# Patient Record
Sex: MALE | Race: BLACK OR AFRICAN AMERICAN | URBAN - METROPOLITAN AREA
[De-identification: names, ages, dates, MRNs, and addresses within clinical notes are randomized per-mention and may not be internally consistent; named-entity substitution may affect disease eponyms.]

---

## 2017-10-11 ENCOUNTER — OFFICE VISIT (OUTPATIENT)
Dept: FAMILY MEDICINE | Facility: CLINIC | Age: 27
End: 2017-10-11
Payer: COMMERCIAL

## 2017-10-11 VITALS
HEIGHT: 70 IN | SYSTOLIC BLOOD PRESSURE: 118 MMHG | HEART RATE: 92 BPM | DIASTOLIC BLOOD PRESSURE: 70 MMHG | TEMPERATURE: 97.2 F | BODY MASS INDEX: 27.2 KG/M2 | WEIGHT: 190 LBS

## 2017-10-11 DIAGNOSIS — B35.6 TINEA CRURIS: ICD-10-CM

## 2017-10-11 DIAGNOSIS — J45.990 EXERCISE-INDUCED ASTHMA: Primary | ICD-10-CM

## 2017-10-11 PROCEDURE — 99203 OFFICE O/P NEW LOW 30 MIN: CPT | Performed by: PHYSICIAN ASSISTANT

## 2017-10-11 RX ORDER — ALBUTEROL SULFATE 90 UG/1
2 AEROSOL, METERED RESPIRATORY (INHALATION) EVERY 4 HOURS PRN
Qty: 8.5 G | Refills: 3 | Status: SHIPPED | OUTPATIENT
Start: 2017-10-11

## 2017-10-11 RX ORDER — CLOTRIMAZOLE 1 %
CREAM (GRAM) TOPICAL 2 TIMES DAILY
Qty: 15 G | Refills: 1 | Status: SHIPPED | OUTPATIENT
Start: 2017-10-11

## 2017-10-11 RX ORDER — ALBUTEROL SULFATE 90 UG/1
2 AEROSOL, METERED RESPIRATORY (INHALATION)
COMMUNITY
End: 2017-10-11

## 2017-10-11 NOTE — PROGRESS NOTES
"  SUBJECTIVE:   Lars Perkins is a 27 year old male who presents to clinic today for the following health issues:      Asthma Follow-Up    Was ACT completed today?  Yes  No flowsheet data found.    Recent asthma triggers that patient is dealing with: None      Amount of exercise or physical activity: 2-3 days/week for an average of 45-60 minutes    Problems taking medications regularly: No    Medication side effects: none  Diet: regular (no restrictions)    Only needs albuterol with exercise.  occasionally has some shortness of breath.        Rash  Onset: 2-3 weeks    Description:   Location: inner thigh   Character: red  Itching (Pruritis): YES    Progression of Symptoms:  same    Accompanying Signs & Symptoms:  Fever: no   Body aches or joint pain: no   Sore throat symptoms: no   Recent cold symptoms: no     History:   Previous similar rash: no     Precipitating factors:   Exposure to similar rash: YES- wife with yeast infection   New exposures: None   Recent travel: no     Alleviating factors:      Therapies Tried and outcome: none             Problem list and histories reviewed & adjusted, as indicated.  Additional history: as documented    There is no problem list on file for this patient.    History reviewed. No pertinent surgical history.    Social History   Substance Use Topics     Smoking status: Never Smoker     Smokeless tobacco: Never Used     Alcohol use Yes     Family History   Problem Relation Age of Onset     DIABETES No family hx of      Hypertension No family hx of              Reviewed and updated as needed this visit by clinical staffTobacco  Allergies  Med Hx  Surg Hx  Fam Hx  Soc Hx      Reviewed and updated as needed this visit by Provider         ROS:  As above    OBJECTIVE:     /70  Pulse 92  Temp 97.2  F (36.2  C) (Oral)  Ht 5' 10\" (1.778 m)  Wt 190 lb (86.2 kg)  BMI 27.26 kg/m2  Body mass index is 27.26 kg/(m^2).  GENERAL: healthy, alert and no distress  SKIN: " hyperpigmented skin with some papules and cracked skin both sides groin     Diagnostic Test Results:  none     ASSESSMENT/PLAN:       1. Exercise-induced asthma  Stable.  refilled  - albuterol (PROAIR HFA) 108 (90 BASE) MCG/ACT Inhaler; Inhale 2 puffs into the lungs every 4 hours as needed for shortness of breath / dyspnea or wheezing As needed  Dispense: 8.5 g; Refill: 3  - Asthma Action Plan (AAP)    2. Tinea cruris  Follow up as needed  - clotrimazole (LOTRIMIN) 1 % cream; Apply topically 2 times daily  Dispense: 15 g; Refill: 1    FUTURE APPOINTMENTS:       - Follow-up visit in 6 months    Lyric Rasmussen PA-C  [facility]

## 2017-10-11 NOTE — NURSING NOTE
"Chief Complaint   Patient presents with     Asthma     follow-up     Derm Problem     rash on legs for the past 2-3 weeks        Initial /70  Pulse 92  Temp 97.2  F (36.2  C) (Oral)  Ht 5' 10\" (1.778 m)  Wt 190 lb (86.2 kg)  BMI 27.26 kg/m2 Estimated body mass index is 27.26 kg/(m^2) as calculated from the following:    Height as of this encounter: 5' 10\" (1.778 m).    Weight as of this encounter: 190 lb (86.2 kg).  Medication Reconciliation: complete    "

## 2017-10-11 NOTE — MR AVS SNAPSHOT
"              After Visit Summary   10/11/2017    Lars Perkins    MRN: [id number]           Patient Information     Date Of Birth          1990        Visit Information        Provider Department      10/11/2017 11:40 AM Lyric Rasmussen PA-C [facility]        Today's Diagnoses     Exercise-induced asthma    -  1    Tinea cruris           Follow-ups after your visit        Who to contact     If you have questions or need follow up information about today's clinic visit or your schedule please contact [facility] directly at [phone].  Normal or non-critical lab and imaging results will be communicated to you by [employer], letter or phone within 4 business days after the clinic has received the results. If you do not hear from us within 7 days, please contact the clinic through [employer] or phone. If you have a critical or abnormal lab result, we will notify you by phone as soon as possible.  Submit refill requests through [employer] or call your pharmacy and they will forward the refill request to us. Please allow 3 business days for your refill to be completed.          Additional Information About Your Visit        MyChart Information     [employer] lets you send messages to your doctor, view your test results, renew your prescriptions, schedule appointments and more. To sign up, go to [email or web address] . Click on \"Log in\" on the left side of the screen, which will take you to the Welcome page. Then click on \"Sign up Now\" on the right side of the page.     You will be asked to enter the access code listed below, as well as some personal information. Please follow the directions to create your username and password.     Your access code is: [email or web address]  Expires: 2018 12:08 PM     Your access code will  in 90 days. If you need help or a new code, please call your [facility] or [phone].        Care EveryWhere ID     This is your Care " "EveryWhere ID. This could be used by other organizations to access your [facility] medical records  [id number]        Your Vitals Were     Pulse Temperature Height BMI (Body Mass Index)          92 97.2  F (36.2  C) (Oral) 5' 10\" (1.778 m) 27.26 kg/m2         Blood Pressure from Last 3 Encounters:   10/11/17 118/70    Weight from Last 3 Encounters:   10/11/17 190 lb (86.2 kg)              We Performed the Following     Asthma Action Plan (AAP)          Today's Medication Changes          These changes are accurate as of: 10/11/17 12:08 PM.  If you have any questions, ask your nurse or doctor.               Start taking these medicines.        Dose/Directions    clotrimazole 1 % cream   Commonly known as:  LOTRIMIN   Used for:  Tinea cruris   Started by:  Lyric Rasmussen PA-C        Apply topically 2 times daily   Quantity:  15 g   Refills:  1         These medicines have changed or have updated prescriptions.        Dose/Directions    albuterol 108 (90 BASE) MCG/ACT Inhaler   Commonly known as:  PROAIR HFA   This may have changed:    - when to take this  - reasons to take this   Used for:  Exercise-induced asthma   Changed by:  Lyric Rasmussen PA-C        Dose:  2 puff   Inhale 2 puffs into the lungs every 4 hours as needed for shortness of breath / dyspnea or wheezing As needed   Quantity:  8.5 g   Refills:  3            Where to get your medicines      These medications were sent to [facility] [place], [place] - [address]  [address], [facility] [address]     Phone:  [phone]     albuterol 108 (90 BASE) MCG/ACT Inhaler    clotrimazole 1 % cream                Primary Care Provider    None Specified       No primary provider on file.        Equal Access to Services     RAKESH PENA : Erickson Acosta, watimbo luswapna, qaybta kaclau mckeon, mirela hale. So [facility] [phone].    ATENCIÓN: Si " jayde hunter, tiene a anders disposición servicios gratuitos de asistencia lingüística. Jus gomes [phone].    We comply with applicable federal civil rights laws and [place] laws. We do not discriminate on the basis of race, color, national origin, age, disability, sex, sexual orientation, or gender identity.            Thank you!     Thank you for choosing [facility]  for your care. Our goal is always to provide you with excellent care. Hearing back from our patients is one way we can continue to improve our services. Please take a few minutes to complete the written survey that you may receive in the mail after your visit with us. Thank you!             Your Updated Medication List - Protect others around you: Learn how to safely use, store and throw away your medicines at www.disposemymeds.org.          This list is accurate as of: 10/11/17 12:08 PM.  Always use your most recent med list.                   Brand Name Dispense Instructions for use Diagnosis    albuterol 108 (90 BASE) MCG/ACT Inhaler    PROAIR HFA    8.5 g    Inhale 2 puffs into the lungs every 4 hours as needed for shortness of breath / dyspnea or wheezing As needed    Exercise-induced asthma       clotrimazole 1 % cream    LOTRIMIN    15 g    Apply topically 2 times daily    Tinea cruris

## 2017-10-11 NOTE — LETTER
My Asthma Action Plan  Name: Lars Perkins   YOB: 1990  Date: 10/11/2017   My doctor: Lyric Rasmussen PA-C   My clinic: [facility]        My Control Medicine: None  My Rescue Medicine: Albuterol (Proair/Ventolin/Proventil) inhaler 2 puffs every 4 hours as needed   My Asthma Severity: intermittent  Avoid your asthma triggers: exercise or sports  None            GREEN ZONE   Good Control    I feel good    No cough or wheeze    Can work, sleep and play without asthma symptoms       Take your asthma control medicine every day.     1. If exercise triggers your asthma, take your rescue medication    15 minutes before exercise or sports, and    During exercise if you have asthma symptoms  2. Spacer to use with inhaler: If you have a spacer, make sure to use it with your inhaler             YELLOW ZONE Getting Worse  I have ANY of these:    I do not feel good    Cough or wheeze    Chest feels tight    Wake up at night   1. Keep taking your Green Zone medications  2. Start taking your rescue medicine:    every 20 minutes for up to 1 hour. Then every 4 hours for 24-48 hours.  3. If you stay in the Yellow Zone for more than 12-24 hours, contact your doctor.  4. If you do not return to the Green Zone in 12-24 hours or you get worse, start taking your oral steroid medicine if prescribed by your provider.           RED ZONE Medical Alert - Get Help  I have ANY of these:    I feel awful    Medicine is not helping    Breathing getting harder    Trouble walking or talking    Nose opens wide to breathe       1. Take your rescue medicine NOW  2. If your provider has prescribed an oral steroid medicine, start taking it NOW  3. Call your doctor NOW  4. If you are still in the Red Zone after 20 minutes and you have not reached your doctor:    Take your rescue medicine again and    Call 911 or go to the emergency room right away    See your regular doctor within 2 weeks of an Emergency Room  or Urgent Care visit for follow-up treatment.        Electronically signed by: Lyric Rasmussen, October 11, 2017    Annual Reminders:  Meet with Asthma Educator,  Flu Shot in the Fall, consider Pneumonia Vaccination for patients with asthma (aged 19 and older).    Pharmacy: [facility] [place], [place] - [address] [place] [address]. [place]                    Asthma Triggers  How To Control Things That Make Your Asthma Worse    Triggers are things that make your asthma worse.  Look at the list below to help you find your triggers and what you can do about them.  You can help prevent asthma flare-ups by staying away from your triggers.      Trigger                                                          What you can do   Cigarette Smoke  Tobacco smoke can make asthma worse. Do not allow smoking in your home, car or around you.  Be sure no one smokes at a child s day care or school.  If you smoke, ask your health care provider for ways to help you quit.  Ask family members to quit too.  Ask your health care provider for a referral to Quit Plan to help you quit smoking, or call 2-316-854-PLAN.     Colds, Flu, Bronchitis  These are common triggers of asthma. Wash your hands often.  Don t touch your eyes, nose or mouth.  Get a flu shot every year.     Dust Mites  These are tiny bugs that live in cloth or carpet. They are too small to see. Wash sheets and blankets in hot water every week.   Encase pillows and mattress in dust mite proof covers.  Avoid having carpet if you can. If you have carpet, vacuum weekly.   Use a dust mask and HEPA vacuum.   Pollen and Outdoor Mold  Some people are allergic to trees, grass, or weed pollen, or molds. Try to keep your windows closed.  Limit time out doors when pollen count is high.   Ask you health care provider about taking medicine during allergy season.     Animal Dander  Some people are allergic to skin flakes, urine or saliva from pets with fur or feathers.  Keep pets with fur or feathers out of your home.    If you can t keep the pet outdoors, then keep the pet out of your bedroom.  Keep the bedroom door closed.  Keep pets off cloth furniture and away from stuffed toys.     Mice, Rats, and Cockroaches  Some people are allergic to the waste from these pests.   Cover food and garbage.  Clean up spills and food crumbs.  Store grease in the refrigerator.   Keep food out of the bedroom.   Indoor Mold  This can be a trigger if your home has high moisture. Fix leaking faucets, pipes, or other sources of water.   Clean moldy surfaces.  Dehumidify basement if it is damp and smelly.   Smoke, Strong Odors, and Sprays  These can reduce air quality. Stay away from strong odors and sprays, such as perfume, powder, hair spray, paints, smoke incense, paint, cleaning products, candles and new carpet.   Exercise or Sports  Some people with asthma have this trigger. Be active!  Ask your doctor about taking medicine before sports or exercise to prevent symptoms.    Warm up for 5-10 minutes before and after sports or exercise.     Other Triggers of Asthma  Cold air:  Cover your nose and mouth with a scarf.  Sometimes laughing or crying can be a trigger.  Some medicines and food can trigger asthma.

## 2017-10-12 ASSESSMENT — ASTHMA QUESTIONNAIRES: ACT_TOTALSCORE: 21

## 2018-12-21 ENCOUNTER — TELEPHONE (OUTPATIENT)
Dept: DERMATOLOGY | Facility: CLINIC | Age: 28
End: 2018-12-21

## 2018-12-21 ENCOUNTER — OFFICE VISIT (OUTPATIENT)
Dept: DERMATOLOGY | Facility: CLINIC | Age: 28
End: 2018-12-21
Payer: COMMERCIAL

## 2018-12-21 VITALS — HEART RATE: 81 BPM | SYSTOLIC BLOOD PRESSURE: 119 MMHG | OXYGEN SATURATION: 99 % | DIASTOLIC BLOOD PRESSURE: 67 MMHG

## 2018-12-21 DIAGNOSIS — L72.0 EIC (EPIDERMAL INCLUSION CYST): Primary | ICD-10-CM

## 2018-12-21 PROCEDURE — 99213 OFFICE O/P EST LOW 20 MIN: CPT | Performed by: PHYSICIAN ASSISTANT

## 2018-12-21 NOTE — PROGRESS NOTES
HPI:   Lars Perkins is a 28 year old male who presents for evaluation of spot on left cheek  chief complaint  Location: left cheek    Condition present for:  5 months.   Previous treatments include: none    Review Of Systems  Eyes: negative  Ears/Nose/Throat: negative  Respiratory: No shortness of breath, dyspnea on exertion, cough, or hemoptysis  Cardiovascular: negative  Gastrointestinal: negative  Genitourinary: negative  Musculoskeletal: negative  Neurologic: negative  Psychiatric: negative    This document serves as a record of the services and decisions personally performed and made by Kristi Timmons, MS, PA-C. It was created on her behalf by Doris Bowers, a trained medical scribe. The creation of this document is based on the provider's statements to the medical scribe.  Doris Bowers 2:28 PM December 21, 2018    PHYSICAL EXAM:    /67   Pulse 81   SpO2 99%   Skin exam performed as follows: Type 5 skin. Mood appropriate  Alert and Oriented X 3. Well developed, well nourished in no distress.  General appearance: Normal  Head including face: Normal  Eyes: conjunctiva and lids: Normal  Mouth: Lips, teeth, gums: Normal  Neck: Normal  Chest-breast/axillae: Normal  Back: Normal  Spleen and liver: Normal  Cardiovascular: Exam of peripheral vascular system by observation for swelling, varicosities, edema: Normal  Genitalia: groin, buttocks: Normal  Extremities: digits/nails (clubbing): Normal  Eccrine and Apocrine glands: Normal  Right upper extremity: Normal  Left upper extremity: Normal  Right lower extremity: Normal  Left lower extremity: Normal  Skin: Scalp and body hair: See below    1. 15 mm subcutaneous nodule on left cheek     ASSESSMENT/PLAN:     1. Epidermal cyst on the left cheek- advised benign and no treatment needed. Bothersome to pt and would like to schedule for excision. Discussed excision with Dr. George if bothersome.          Follow-up: excision/PRN sooner  CC:    Scribed By: Doris Bowers, Medical Scribe    The information in this document, created by the medical scribe for me, accurately reflects the services I personally performed and the decisions made by me. I have reviewed and approved this document for accuracy prior to leaving the patient care area.  December 21, 2018 2:35 PM    Kristi Timmons MS, PA-C

## 2018-12-21 NOTE — TELEPHONE ENCOUNTER
"Can this patient be worked into the schedule \"Super Mohs day\" - facial cyst removal per Kristi?    Pls advise.  Contact info on file.  "

## 2018-12-21 NOTE — TELEPHONE ENCOUNTER
Routing to Dr. George to advise on add on for cyst removal or hold message for MELISSA Yusuf    Thank You,    Shabana HEAD RN  [facility]  [phone]

## 2018-12-27 NOTE — TELEPHONE ENCOUNTER
Called and LM for patient to call back in regards to future cyst removal appointment.    Pascale RN-BSN  [facility]  [phone]

## 2018-12-28 NOTE — TELEPHONE ENCOUNTER
Called and spoke to patient. Scheduled future cyst removal appointment. Patient voiced understanding.    MELISSA Ashraf-BSN  [facility]  [phone]

## 2019-02-14 ENCOUNTER — OFFICE VISIT (OUTPATIENT)
Dept: DERMATOLOGY | Facility: CLINIC | Age: 29
End: 2019-02-14
Payer: COMMERCIAL

## 2019-02-14 VITALS
OXYGEN SATURATION: 97 % | HEIGHT: 71 IN | SYSTOLIC BLOOD PRESSURE: 111 MMHG | DIASTOLIC BLOOD PRESSURE: 66 MMHG | BODY MASS INDEX: 26.69 KG/M2 | HEART RATE: 90 BPM

## 2019-02-14 DIAGNOSIS — L72.0 EPIDERMAL CYST: Primary | ICD-10-CM

## 2019-02-14 PROCEDURE — 88331 PATH CONSLTJ SURG 1 BLK 1SPC: CPT | Performed by: DERMATOLOGY

## 2019-02-14 PROCEDURE — 12051 INTMD RPR FACE/MM 2.5 CM/<: CPT | Performed by: DERMATOLOGY

## 2019-02-14 PROCEDURE — 11442 EXC FACE-MM B9+MARG 1.1-2 CM: CPT | Mod: 51 | Performed by: DERMATOLOGY

## 2019-02-14 NOTE — PROGRESS NOTES
Lars Perkins is a 28 year old year old male patient here today for evaluation and managment of tender draining cyst on left cheek.  Patient reports the following modifying factors none.  Associated symptoms: none.  Patient has no other skin complaints today.  Remainder of the HPI, Meds, PMH, Allergies, FH, and SH was reviewed in chart.    History reviewed. No pertinent past medical history.    History reviewed. No pertinent surgical history.     Family History   Problem Relation Age of Onset     Diabetes No family hx of      Hypertension No family hx of        Social History     Socioeconomic History     Marital status:      Spouse name: Not on file     Number of children: Not on file     Years of education: Not on file     Highest education level: Not on file   Social Needs     Financial resource strain: Not on file     Food insecurity - worry: Not on file     Food insecurity - inability: Not on file     Transportation needs - medical: Not on file     Transportation needs - non-medical: Not on file   Occupational History     Not on file   Tobacco Use     Smoking status: Never Smoker     Smokeless tobacco: Never Used   Substance and Sexual Activity     Alcohol use: Yes     Drug use: No     Sexual activity: Yes     Partners: Female   Other Topics Concern     Parent/sibling w/ CABG, MI or angioplasty before 65F 55M? No   Social History Narrative     Not on file       Outpatient Encounter Medications as of 2/14/2019   Medication Sig Dispense Refill     albuterol (PROAIR HFA) 108 (90 BASE) MCG/ACT Inhaler Inhale 2 puffs into the lungs every 4 hours as needed for shortness of breath / dyspnea or wheezing As needed 8.5 g 3     clotrimazole (LOTRIMIN) 1 % cream Apply topically 2 times daily 15 g 1     No facility-administered encounter medications on file as of 2/14/2019.              Review Of Systems  Skin: As above  Eyes: negative  Ears/Nose/Throat: negative  Respiratory: No shortness of breath, dyspnea on  "exertion, cough, or hemoptysis  Cardiovascular: negative  Gastrointestinal: negative  Genitourinary: negative  Musculoskeletal: negative  Neurologic: negative  Psychiatric: negative  Hematologic/Lymphatic/Immunologic: negative  Endocrine: negative      O:   NAD, WDWN, Alert & Oriented, Mood & Affect wnl, Vitals stable   Here today alone   /66   Pulse 90   Ht 1.797 m (5' 10.75\")   SpO2 97%   BMI 26.69 kg/m     General appearance normal   Vitals stable   Alert, oriented and in no acute distress     L cheek 1.3cm nodule with comedone      Eyes: Conjunctivae/lids:Normal     ENT: Lips, buccal mucosa, tongue: normal    MSK:Normal    Cardiovascular: peripheral edema none    Pulm: Breathing Normal    Lymph Nodes: No Head and Neck Lymphadenopathy     Neuro/Psych: Orientation:Normal; Mood/Affect:Normal      MICRO:   L cheek: Normal epidermis with cyst lined by stratified squamous epithelium with epidermal keratinization without overlying connection to epidermis.    A/P:  1. L cheek eic 1.3cm   EXCISION OF CYST AND INT: After thorough discussion of PGACAC, consent obtained, anesthesia and prep, the margins of the cyst were identified and an elliptical incision was made encompassing the cyst. The incisions were made through the skin and down to and including the subcutaneous tissue. The cyst was removed en bloc and submitted for frozen pathologic review. hemostasis was achieved. The wound edges were then closed in a layered fashion, being careful not to leave any dead space. Postoperative length was 1.5 cm.   EBL minimal; complications none; wound care routine. The patient was discharged in good condition and will return in one week for wound evaluation.    BENIGN LESIONS DISCUSSED WITH PATIENT:  I discussed the specifics of tumor, prognosis, and genetics of benign lesions.  I explained that treatment of these lesions would be purely cosmetic and not medically neccessary.  I discussed with patient different removal " options including excision, cautery and /or laser.

## 2019-02-14 NOTE — PATIENT INSTRUCTIONS
Sutured Wound Care     [facility]: [phone]    [facility]: [phone]          ? No strenuous activity for 48 hours. Resume moderate activity in 48 hours. No heavy exercising until you are seen for follow up in one week.     ? Take Tylenol as needed for discomfort.                         ? Do not drink alcoholic beverages for 48 hours.     ? Keep the pressure bandage in place for 24 hours. If the bandage becomes blood tinged or loose, reinforce it with gauze and tape.        (Refer to the reverse side of this page for management of bleeding).    ? Remove pressure bandage in 24 hours     ? Leave the flat bandage in place until your follow up appointment.    ? Keep the bandage dry. Wash around it carefully.    ? If the tape becomes soiled or starts to come off, reinforce it with additional paper tape.    ? Do not smoke for 3 weeks; smoking is detrimental to wound healing.    ? It is normal to have swelling and bruising around the surgical site. The bruising will fade in approximately 10-14 days. Elevate the area to reduce swelling.    ? Numbness, itchiness and sensitivity to temperature changes can occur after surgery and may take up to 18 months to normalize.      POSSIBLE COMPLICATIONS    BLEEDIN. Leave the bandage in place.  2. Use tightly rolled up gauze or a cloth to apply direct pressure over the bandage for 20   minutes.  3. Reapply pressure for an additional 20 minutes if necessary  4. Call the office or go to the nearest emergency room if pressure fails to stop the bleeding.  5. Use additional gauze and tape to maintain pressure once the bleeding has stopped.        PAIN:    1. Post operative pain should slowly get better, never worse.  2. A severe increase in pain may indicate a problem. Call the office if this occurs.    In case of emergency phone:Dr George [phone]      Remove bandage 19 blot with water  Apply steri-stripes and tape    WOUND CARE  INSTRUCTIONS  for  ONE WEEK AFTER SURGERY          1) Leave flat bandage on your skin for one week after 2/21 bandage change.  2) 2/28 when you remove the bandage, you may resume your regular skin care routine, including washing with mild soap and water, applying moisturizer, make-up and sunscreen.    3) If there are any open or bleeding areas at the incision/graft site you should begin to cover the area with a bandage daily as follows:    1) Clean and dry the area with plain tap water using a Q-tip or sterile gauze pad.  2) Apply Polysporin or Bacitracin ointment to the open area.  3) Cover the wound with a band-aid or a sterile non-stick gauze pad and micropore paper tape.         SIGNS OF INFECTION  - If you notice any of these signs of infection, call your doctor right away: expanding redness around the wound.  - Yellow or greenish-colored pus or cloudy wound drainage.    - Red streaking spreading from the wound.  - Increased swelling, tenderness, or pain around the wound.   - Fever.    Please remember that yellow and clear drainage from a wound can be normal and related to normal wound healing.  Isolated drainage from a wound without a combination of the above features does not indicate infection.       *Once the bandages are removed, the scar will be red and firm (especially in the lip/chin area). This is normal and will fade in time. It might take 6-12 months for this to happen.     *Massaging the area will help the scar soften and fade quicker. Begin to massage the area one month after the bandages have been removed. To massage apply pressure directly and firmly over the scar with the fingertips and move in a circular motion. Massage the area for a few minutes several times a day. Continue to massage the site for several months.    *Approximately 6-8 weeks after surgery it is not uncommon to see the formation of  tender pimple-like  bump along the scar. This is normal. As the scar continues to mature and the  stitches underneath the skin begin to dissolve, this might occur. Do not pick or squeeze, this will resolve on it s own. Should one break open producing a small amount of drainage, apply Polysporin or Bacitracin ointment a few times a day until the wound is completely healed.    *Numbness in the surgical area is expected. It might take 12-18 months for the feeling to return to normal. During this time sensations of itchiness, tingling and occasional sharp pains might be noted. These feelings are normal and will subside once the nerves have completely healed.         IN CASE OF EMERGENCY: Dr George [phone]     If you were seen in [place] call: [phone]    If you were seen in [place] call: [phone]

## 2019-02-14 NOTE — NURSING NOTE
"Initial /66   Pulse 90   Ht 1.797 m (5' 10.75\")   SpO2 97%   BMI 26.69 kg/m   Estimated body mass index is 26.69 kg/m  as calculated from the following:    Height as of this encounter: 1.797 m (5' 10.75\").    Weight as of 10/11/17: 86.2 kg (190 lb). .      "